# Patient Record
Sex: FEMALE | Race: WHITE | Employment: FULL TIME | URBAN - METROPOLITAN AREA
[De-identification: names, ages, dates, MRNs, and addresses within clinical notes are randomized per-mention and may not be internally consistent; named-entity substitution may affect disease eponyms.]

---

## 2020-02-01 ENCOUNTER — HOSPITAL ENCOUNTER (EMERGENCY)
Facility: CLINIC | Age: 45
Discharge: HOME | End: 2020-02-01
Attending: EMERGENCY MEDICINE | Admitting: EMERGENCY MEDICINE
Payer: COMMERCIAL

## 2020-02-01 VITALS
OXYGEN SATURATION: 84 % | RESPIRATION RATE: 20 BRPM | TEMPERATURE: 97.6 F | DIASTOLIC BLOOD PRESSURE: 81 MMHG | HEART RATE: 93 BPM | SYSTOLIC BLOOD PRESSURE: 117 MMHG

## 2020-02-01 DIAGNOSIS — D58.2 ELEVATED HEMOGLOBIN (H): ICD-10-CM

## 2020-02-01 DIAGNOSIS — G62.9 PERIPHERAL POLYNEUROPATHY: ICD-10-CM

## 2020-02-01 LAB
ALBUMIN SERPL-MCNC: 3.7 G/DL (ref 3.4–5)
ALP SERPL-CCNC: 64 U/L (ref 40–150)
ALT SERPL W P-5'-P-CCNC: 49 U/L (ref 0–50)
ANION GAP SERPL CALCULATED.3IONS-SCNC: 6 MMOL/L (ref 3–14)
AST SERPL W P-5'-P-CCNC: 43 U/L (ref 0–45)
BILIRUB SERPL-MCNC: 0.7 MG/DL (ref 0.2–1.3)
BUN SERPL-MCNC: 5 MG/DL (ref 7–30)
CALCIUM SERPL-MCNC: 9.2 MG/DL (ref 8.5–10.1)
CHLORIDE SERPL-SCNC: 99 MMOL/L (ref 94–109)
CO2 SERPL-SCNC: 30 MMOL/L (ref 20–32)
CREAT SERPL-MCNC: 0.64 MG/DL (ref 0.52–1.04)
ERYTHROCYTE [DISTWIDTH] IN BLOOD BY AUTOMATED COUNT: 13.6 % (ref 10–15)
GFR SERPL CREATININE-BSD FRML MDRD: >90 ML/MIN/{1.73_M2}
GLUCOSE SERPL-MCNC: 99 MG/DL (ref 70–99)
HCG SERPL QL: NEGATIVE
HCT VFR BLD AUTO: 57.5 % (ref 35–47)
HGB BLD-MCNC: 18.8 G/DL (ref 11.7–15.7)
MCH RBC QN AUTO: 31.6 PG (ref 26.5–33)
MCHC RBC AUTO-ENTMCNC: 32.7 G/DL (ref 31.5–36.5)
MCV RBC AUTO: 97 FL (ref 78–100)
PLATELET # BLD AUTO: 169 10E9/L (ref 150–450)
POTASSIUM SERPL-SCNC: 3.8 MMOL/L (ref 3.4–5.3)
PROT SERPL-MCNC: 7.3 G/DL (ref 6.8–8.8)
RBC # BLD AUTO: 5.95 10E12/L (ref 3.8–5.2)
SODIUM SERPL-SCNC: 135 MMOL/L (ref 133–144)
TSH SERPL DL<=0.005 MIU/L-ACNC: 1.2 MU/L (ref 0.4–4)
WBC # BLD AUTO: 4.6 10E9/L (ref 4–11)

## 2020-02-01 PROCEDURE — 84443 ASSAY THYROID STIM HORMONE: CPT | Performed by: EMERGENCY MEDICINE

## 2020-02-01 PROCEDURE — 84703 CHORIONIC GONADOTROPIN ASSAY: CPT | Performed by: EMERGENCY MEDICINE

## 2020-02-01 PROCEDURE — 99285 EMERGENCY DEPT VISIT HI MDM: CPT | Mod: 25

## 2020-02-01 PROCEDURE — 80053 COMPREHEN METABOLIC PANEL: CPT | Performed by: EMERGENCY MEDICINE

## 2020-02-01 PROCEDURE — 85027 COMPLETE CBC AUTOMATED: CPT | Performed by: EMERGENCY MEDICINE

## 2020-02-01 PROCEDURE — 25000128 H RX IP 250 OP 636: Performed by: EMERGENCY MEDICINE

## 2020-02-01 PROCEDURE — 96374 THER/PROPH/DIAG INJ IV PUSH: CPT

## 2020-02-01 RX ORDER — GABAPENTIN 300 MG/1
CAPSULE ORAL
Qty: 60 CAPSULE | Refills: 0 | Status: SHIPPED | OUTPATIENT
Start: 2020-02-01

## 2020-02-01 RX ORDER — LORAZEPAM 2 MG/ML
1 INJECTION INTRAMUSCULAR ONCE
Status: COMPLETED | OUTPATIENT
Start: 2020-02-01 | End: 2020-02-01

## 2020-02-01 RX ADMIN — LORAZEPAM 1 MG: 2 INJECTION INTRAMUSCULAR; INTRAVENOUS at 15:51

## 2020-02-01 ASSESSMENT — ENCOUNTER SYMPTOMS
FEVER: 0
NUMBNESS: 1
WEAKNESS: 0
HEADACHES: 1
VOMITING: 0
UNEXPECTED WEIGHT CHANGE: 1
NAUSEA: 0

## 2020-02-01 NOTE — ED TRIAGE NOTES
Presents with numbness to feet, hands and front of thighs which worsened this morning and has been on and off for a week or so. Pt states she has had burning to her feet for months and this is accompanied by headaches and anxiety.

## 2020-02-01 NOTE — ED AVS SNAPSHOT
[facility] Emergency Department  [address]  [facility] [address]  Phone:  [phone]  Fax:  [phone]                                    Lizbeth Fofana   MRN: [id number]    Department:  [facility] Emergency Department   Date of Visit:  2/1/2020           After Visit Summary Signature Page    I have received my discharge instructions, and my questions have been answered. I have discussed any challenges I see with this plan with the nurse or doctor.    ..........................................................................................................................................  Patient/Patient Representative Signature      ..........................................................................................................................................  Patient Representative Print Name and Relationship to Patient    ..................................................               ................................................  Date                                   Time    ..........................................................................................................................................  Reviewed by Signature/Title    ...................................................              ..............................................  Date                                               Time          22EPIC Rev 08/18

## 2020-02-01 NOTE — ED PROVIDER NOTES
History     Chief Complaint:  Numbness    HPI   Lizbeth Fofana is a 44 year old female who presents with 4 days of numbness in her hands and anterior thighs. The patient explains that she had intermittent burning in her bilateral feet for the last few months. She saw her PCP and had work up regarding this, without any findings. Last week she felt that both her hands were numb, so she went into clinic again, where she was ultimately referred to neurology. Today, she woke with return of numbness in both hands with some radiation into her forearms and in her anterior thighs. She also notes a mild intermittent headache, but this is common for her. She endorses normal strength in her extremities. She notes an unintentional weight loss of 35 over the last 6 months, noting that she does not normally have a big appetite. No reported fevers, vomiting, diarrhea, recent illness, vaginal bleeding. She still has normal periods. No recent travel or new foods that may have provoked her symptoms. She has taking Suboxone for the last several years and Lisinopril for the last year.     Allergies:  Bactrim  Penicillins   Sulfa  Hydrocodone-Acetaminophen    Medications:    Suboxone  Lisinopril    Past Medical History:    Depression  Anxiety  Impulse control disease  ADD  Insomnia  Carpal tunnel syndrome   Gestational diabetes mellitus  Heart burn  Fibromyalgia  Migraines  TMJ disorder  Asthma    Past Surgical History:    LEEP  [place] teeth extractiom    Family History:    History reviewed. No pertinent family history.     Social History:  Smoking status: Current every day smoker  Presents to the ED with a   Marital Status:  Single     Review of Systems   Constitutional: Positive for unexpected weight change. Negative for fever.   Gastrointestinal: Negative for nausea and vomiting.   Genitourinary: Negative for vaginal bleeding.   Neurological: Positive for numbness and headaches. Negative for weakness.   All other systems  reviewed and are negative.      Physical Exam     Patient Vitals for the past 24 hrs:   BP Temp Temp src Pulse Heart Rate Resp SpO2   02/01/20 1516 (!) 139/93 97.6  F (36.4  C) Oral 115 115 20 --   02/01/20 1515 (!) 139/93 -- -- 113 -- -- 91 %       Physical Exam    General:   Pleasant, age appropriate female.  HEENT:    Oropharynx is moist.  Eyes:    Conjunctiva normal     PERRL     EOMs intact  Neck:    Supple, no meningismus.     CV:     Regular rate and rhythm.      No murmurs, rubs or gallops.       2+ radial pulses bilateral.       No lower extremity edema.  PULM:    Clear to auscultation bilateral.       No respiratory distress.      Good air exchange.  ABD:    Soft, non-tender, non-distended.       No pulsatile masses.       No rebound, guarding or rigidity.  MSK:     No gross deformity to all four extremities.   LYMPH:   No cervical lymphadenopathy.  NEURO:   Alert & O x 3.     CN II-XII intact, speech is clear with no aphasia.       Finger to nose within normal limits.  No pronator drift.       Strength is 5/5 in all 4 extremities.       Sensation is intact to light touch and pinprick     Normal muscular tone, no tremor.     No clonus     2+ bilateral patellar tendon reflexes  Skin:    Warm, dry and intact.    Psych:    Anxious        Emergency Department Course   Laboratory:  CBC: HGB 18.8 (H) o/w WNL (WBC 4.6, )  CMP: Bun 5 (L) o/w WNL (Creatinine 0.64)  TSH with free T4 reflex: 1.20  HCG Qualitative: Negative    Interventions:  1551: 1 mg Lorazepam IV    Emergency Department Course:  Past medical records, nursing notes, and vitals reviewed.  1516: I performed an exam of the patient and obtained history, as documented above.    IV inserted and blood drawn.     1650: I rechecked the patient. Explained findings to patient.    Findings and plan explained to the patient. Patient discharged home with instructions regarding supportive care, medications, and reasons to return. The importance of close  follow-up was reviewed. The patient was prescribed Gabapentin.     Impression & Plan    Medical Decision Makin-year-old female presented to the ED with progressive numbness in all 4 extremities.  This process has been present for a couple months although worse in the last 1 week.  She has no loss of discriminatory touch with intact sensation.  Her neurological examination is unremarkable.  Findings are not consistent with an acute intracranial event.  No findings that would draw concern for spinal cord pathology.  This appears to be a global process, specifically peripheral neuropathy.  Basic laboratory studies were unremarkable other than elevated hemoglobin.  She was recommended to have this closely followed by her primary care physician.  The cause of her peripheral neuropathy is uncertain.  She has scheduled follow-up with neurology.  I will initiate her on gabapentin to assist with symptoms.    Diagnosis:    ICD-10-CM   1. Peripheral polyneuropathy G62.9       Disposition: Discharged to home    Discharge Medications:  New Prescriptions    GABAPENTIN (NEURONTIN) 300 MG CAPSULE    300 mg PO once on day one then 300 BID on day two then 300 PO TID     IMaribell, am serving as a scribe at 3:16 PM on 2020 to document services personally performed by Hood Thibodeaux MD based on my observations and the provider's statements to me.     Maribell Hennessy  2020   [facility] EMERGENCY DEPARTMENT       Hood Thibodeaux MD  [phone]

## 2025-06-22 ENCOUNTER — HOSPITAL ENCOUNTER (EMERGENCY)
Facility: CLINIC | Age: 50
Discharge: HOME | End: 2025-06-22
Attending: EMERGENCY MEDICINE | Admitting: EMERGENCY MEDICINE
Payer: COMMERCIAL

## 2025-06-22 VITALS
HEIGHT: 64 IN | HEART RATE: 98 BPM | DIASTOLIC BLOOD PRESSURE: 112 MMHG | BODY MASS INDEX: 26.01 KG/M2 | TEMPERATURE: 97.6 F | OXYGEN SATURATION: 100 % | RESPIRATION RATE: 20 BRPM | WEIGHT: 152.34 LBS | SYSTOLIC BLOOD PRESSURE: 149 MMHG

## 2025-06-22 DIAGNOSIS — Z65.9 OTHER SOCIAL STRESSOR: ICD-10-CM

## 2025-06-22 DIAGNOSIS — F11.93 OPIOID WITHDRAWAL (H): ICD-10-CM

## 2025-06-22 LAB
HOLD SPECIMEN: NORMAL

## 2025-06-22 PROCEDURE — 99283 EMERGENCY DEPT VISIT LOW MDM: CPT

## 2025-06-22 PROCEDURE — 250N000013 HC RX MED GY IP 250 OP 250 PS 637: Performed by: EMERGENCY MEDICINE

## 2025-06-22 RX ORDER — MODAFINIL 100 MG/1
100 TABLET ORAL DAILY
COMMUNITY

## 2025-06-22 RX ORDER — HYDROXYZINE PAMOATE 50 MG/1
50 CAPSULE ORAL 3 TIMES DAILY PRN
COMMUNITY

## 2025-06-22 RX ORDER — DULOXETIN HYDROCHLORIDE 60 MG/1
60 CAPSULE, DELAYED RELEASE ORAL DAILY
COMMUNITY

## 2025-06-22 RX ORDER — HYDROXYZINE HYDROCHLORIDE 25 MG/1
25-50 TABLET, FILM COATED ORAL 3 TIMES DAILY PRN
Qty: 60 TABLET | Refills: 0 | Status: SHIPPED | OUTPATIENT
Start: 2025-06-22

## 2025-06-22 RX ORDER — HYDROXYZINE HYDROCHLORIDE 25 MG/1
25 TABLET, FILM COATED ORAL ONCE
Status: COMPLETED | OUTPATIENT
Start: 2025-06-22 | End: 2025-06-22

## 2025-06-22 RX ORDER — CLONIDINE HYDROCHLORIDE 0.1 MG/1
0.1 TABLET ORAL 2 TIMES DAILY
COMMUNITY

## 2025-06-22 RX ORDER — LISINOPRIL 10 MG/1
10 TABLET ORAL DAILY
COMMUNITY

## 2025-06-22 RX ADMIN — HYDROXYZINE HYDROCHLORIDE 25 MG: 25 TABLET, FILM COATED ORAL at 05:03

## 2025-06-22 ASSESSMENT — ACTIVITIES OF DAILY LIVING (ADL): ADLS_ACUITY_SCORE: 41

## 2025-06-22 ASSESSMENT — COLUMBIA-SUICIDE SEVERITY RATING SCALE - C-SSRS
2. HAVE YOU ACTUALLY HAD ANY THOUGHTS OF KILLING YOURSELF IN THE PAST MONTH?: NO
1. IN THE PAST MONTH, HAVE YOU WISHED YOU WERE DEAD OR WISHED YOU COULD GO TO SLEEP AND NOT WAKE UP?: NO
6. HAVE YOU EVER DONE ANYTHING, STARTED TO DO ANYTHING, OR PREPARED TO DO ANYTHING TO END YOUR LIFE?: NO

## 2025-06-22 ASSESSMENT — LIFESTYLE VARIABLES: TOTAL_SCORE: 7

## 2025-06-22 NOTE — ED TRIAGE NOTES
Pt reports she is in Saboxone with withdrawal. Pt reports she is feeling weak and has had decreased appetite. Pt having problems sleeping. Pt denies feeling suicidal

## 2025-06-22 NOTE — ED PROVIDER NOTES
"  Emergency Department Note      History of Present Illness     Chief Complaint   Withdrawal    HPI   Lizbeth Fofana is a 50 year old female with a history of ADHD, anxiety, and opioid use disorder who presents to the ED for evaluation of withdrawal symptoms. She reports stopping Suboxone abruptly two weeks ago after beginning to taper. She states having an colonoscopy shortly right after which made symptoms worse. Her first week was fine but the second week was worse. She tried taking Trazodone but felt like a \"zombie.\" She only took clonidine twice yesterday otherwise has been taking this 3 times daily.  She was prescribed hydroxyzine but was concerned that the dose was too high at 50 mg so has her daughter's prescription for 25 mg but is yet to take this. Patient isn't taking duloxetine as prescribed stating she only takes it several times a week.  She has had a very bad response to gabapentin in the past and does not wish to try this medication. She can't sleep and has been dealing with family stressors lately. Denies hallucinations.  No fevers or vomiting.  She is very worried about caring for her horses over the weekend.    Independent Historian   None    Review of External Notes   Care Everywhere reviewed in epic updated.    Past Medical History   Medical History    Past Medical History:   Diagnosis Date    ADHD (attention deficit hyperactivity disorder)     Anxiety     Asthma     Chronic pain syndrome/Fibromyalgia     Depressive disorder     Gestational diabetes mellitus     Hypertension     Opioid use disorder      Medications   cloNIDine (CATAPRES) 0.1 MG tablet  DULoxetine (CYMBALTA) 60 MG capsule  hydrOXYzine (VISTARIL) 50 MG capsule  hydrOXYzine HCl (ATARAX) 25 MG tablet  lisinopril (ZESTRIL) 10 MG tablet  modafinil (PROVIGIL) 100 MG tablet  PROAIR  (90 BASE) MCG/ACT IN AERS      Surgical History   Past Surgical History:   Procedure Laterality Date    COLPOSCOPY CERVIX, LOOP ELECTRODE BIOPSY, " "COMBINED      [place] teeth extraction       Physical Exam     Patient Vitals for the past 24 hrs:   BP Temp Temp src Pulse Resp SpO2 Height Weight   06/22/25 0441 -- -- -- -- -- 99 % -- --   06/22/25 0440 -- -- -- -- -- 93 % -- --   06/22/25 0439 -- -- -- -- -- 95 % -- --   06/22/25 0438 -- -- -- -- -- 100 % -- --   06/22/25 0437 -- -- -- -- -- 96 % -- --   06/22/25 0436 -- -- -- -- -- 99 % -- --   06/22/25 0435 -- -- -- -- -- 99 % -- --   06/22/25 0434 -- -- -- -- -- 97 % -- --   06/22/25 0424 -- -- -- -- -- 99 % -- --   06/22/25 0423 146/89 -- -- 93 -- 99 % -- --   06/22/25 0416 130/102 97.6  F (36.4  C) Temporal 119 20 100 % 1.626 m (5' 4\") 69.1 kg (152 lb 5.4 oz)     Physical Exam  Nursing note and vitals reviewed.  Constitutional: Resting and comfortable appearing  HENT:   Mouth/Throat: Mucous membranes are normal.   Cardiovascular: Normal rate, regular rhythm and normal heart sounds.  No murmur.  Pulmonary/Chest: Effort normal and breath sounds normal. No respiratory distress.   Abdominal: Soft. Normal appearance. There is no tenderness.  Musculoskeletal: Normal range of motion of extremities.   Neurological: Alert.  Oriented x 3  Skin: Skin is warm and dry.   Psychiatric: Anxious and slightly agitated.  No hallucinations described.  Does not appear to be responding to internal stimuli.    Diagnostics   Lab Results   Labs Ordered and Resulted from Time of ED Arrival to Time of ED Departure - No data to display    ED Course    Medications Administered   Medications   hydrOXYzine HCl (ATARAX) tablet 25 mg (has no administration in time range)     Discussion of Management   None    ED Course   ED Course as of 06/22/25 0500   Sun Jun 22, 2025   0398 I obtained history and examined the patient as noted above.    3785 I prepared the patient to be discharged home.      Additional Documentation  None  Medical Decision Making / Diagnosis   TAMIA Fofana is a 50 year old female who presents requesting help with " ongoing withdrawal symptoms.  She has been off Suboxone now for 2 weeks.  Given this time course I do question how much of her symptomatology is based on ongoing withdraw versus what sounds like other significant life stressors she is dealing with including pending divorce/separation and responsibilities that include caring for multiple large animals including horses on her family [place].  No signs of psychosis at this time.  We had a long discussion at the bedside regarding her current medications and options.  She has not tolerated trazodone or gabapentin.  She has been taking clonidine though only intermittently.  I encouraged her to keep the scheduled 3 times daily.  She is on duloxetine but again has been inconsistent with dosing.  Is important she take this regularly.  We discussed using hydroxyzine which she has been hesitant as she thought this was similar to trazodone.  She has a prescription for 50 mg tablets and at her request I will write 25 mg tablets so she can dose that either 1 or 2 tablets per dose to see how she responds.  She received her first dose here.  I do not feel that benzodiazepines are appropriate.  She specifically has declined refill of any opioids including Suboxone or other narcotics such as benzodiazepines.  Unfortunately I was called back into the room at the time of discharge after what I thought was a very good discussion with the patient during her interaction.  She was quite upset.  Her expectation would be that she would be feeling much better at the time of discharge.  I discussed with her that these expectations are probably not realistic.  I have offered to have her speak to one of our mental health specialist which she initially stated she wanted to but then told me to take the IV out and that she was just going to go home.  She was quite verbally aggressive and raised her voice on multiple occasions.  I asked her to not do this.  At this time she will be discharged home as  she no longer wishes to stay and is declining mental health consultation.  I advised her that we are always happy to see her in the emergency department should she have any concerns going forward.  I apologize that she did not feel like her expectations were met.    Disposition   The patient was discharged.     Diagnosis     ICD-10-CM    1. Other social stressor  Z65.9       2. Opioid withdrawal (H)  F11.93          Discharge Medications   New Prescriptions    HYDROXYZINE HCL (ATARAX) 25 MG TABLET    Take 1-2 tablets (25-50 mg) by mouth 3 times daily as needed for anxiety or other (Opioid withdrawal symptoms).     Scribe Disclosure:  I, Lisa Gomez, am serving as a scribe at 4:55 AM on 6/22/2025 to document services personally performed by Dale Matthews MD based on my observations and the provider's statements to me.      Dale Matthews MD  06/22/25 0190

## 2025-08-16 ENCOUNTER — HEALTH MAINTENANCE LETTER (OUTPATIENT)
Age: 50
End: 2025-08-16